# Patient Record
Sex: MALE
[De-identification: names, ages, dates, MRNs, and addresses within clinical notes are randomized per-mention and may not be internally consistent; named-entity substitution may affect disease eponyms.]

---

## 2023-03-31 PROBLEM — Z00.00 ENCOUNTER FOR PREVENTIVE HEALTH EXAMINATION: Status: ACTIVE | Noted: 2023-03-31

## 2023-04-01 ENCOUNTER — NON-APPOINTMENT (OUTPATIENT)
Age: 73
End: 2023-04-01

## 2023-04-03 ENCOUNTER — TRANSCRIPTION ENCOUNTER (OUTPATIENT)
Age: 73
End: 2023-04-03

## 2023-04-03 ENCOUNTER — RESULT REVIEW (OUTPATIENT)
Age: 73
End: 2023-04-03

## 2023-04-03 ENCOUNTER — OUTPATIENT (OUTPATIENT)
Dept: OUTPATIENT SERVICES | Facility: HOSPITAL | Age: 73
LOS: 1 days | End: 2023-04-03
Payer: MEDICARE

## 2023-04-03 ENCOUNTER — APPOINTMENT (OUTPATIENT)
Dept: ORTHOPEDIC SURGERY | Facility: CLINIC | Age: 73
End: 2023-04-03
Payer: MEDICARE

## 2023-04-03 VITALS
OXYGEN SATURATION: 98 % | HEART RATE: 86 BPM | WEIGHT: 221 LBS | TEMPERATURE: 98.1 F | RESPIRATION RATE: 18 BRPM | DIASTOLIC BLOOD PRESSURE: 77 MMHG | BODY MASS INDEX: 30.94 KG/M2 | SYSTOLIC BLOOD PRESSURE: 128 MMHG | HEIGHT: 71 IN

## 2023-04-03 DIAGNOSIS — Z86.39 PERSONAL HISTORY OF OTHER ENDOCRINE, NUTRITIONAL AND METABOLIC DISEASE: ICD-10-CM

## 2023-04-03 DIAGNOSIS — Z80.8 FAMILY HISTORY OF MALIGNANT NEOPLASM OF OTHER ORGANS OR SYSTEMS: ICD-10-CM

## 2023-04-03 DIAGNOSIS — Z78.9 OTHER SPECIFIED HEALTH STATUS: ICD-10-CM

## 2023-04-03 PROCEDURE — 77073 BONE LENGTH STUDIES: CPT | Mod: 26

## 2023-04-03 PROCEDURE — 77073 BONE LENGTH STUDIES: CPT

## 2023-04-03 PROCEDURE — 73564 X-RAY EXAM KNEE 4 OR MORE: CPT

## 2023-04-03 PROCEDURE — 99204 OFFICE O/P NEW MOD 45 MIN: CPT

## 2023-04-03 PROCEDURE — 73564 X-RAY EXAM KNEE 4 OR MORE: CPT | Mod: 26,50

## 2023-04-04 ENCOUNTER — OUTPATIENT (OUTPATIENT)
Dept: OUTPATIENT SERVICES | Facility: HOSPITAL | Age: 73
LOS: 1 days | End: 2023-04-04

## 2023-04-04 ENCOUNTER — APPOINTMENT (OUTPATIENT)
Dept: MRI IMAGING | Facility: CLINIC | Age: 73
End: 2023-04-04
Payer: MEDICARE

## 2023-04-04 PROCEDURE — 73721 MRI JNT OF LWR EXTRE W/O DYE: CPT | Mod: 26,LT

## 2023-04-10 ENCOUNTER — NON-APPOINTMENT (OUTPATIENT)
Age: 73
End: 2023-04-10

## 2023-04-10 DIAGNOSIS — M25.562 PAIN IN LEFT KNEE: ICD-10-CM

## 2023-04-12 ENCOUNTER — APPOINTMENT (OUTPATIENT)
Dept: ORTHOPEDIC SURGERY | Facility: CLINIC | Age: 73
End: 2023-04-12
Payer: MEDICARE

## 2023-04-12 VITALS — BODY MASS INDEX: 30.94 KG/M2 | HEIGHT: 71 IN | WEIGHT: 221 LBS

## 2023-04-12 PROCEDURE — 20610 DRAIN/INJ JOINT/BURSA W/O US: CPT | Mod: 50

## 2023-04-12 PROCEDURE — 99213 OFFICE O/P EST LOW 20 MIN: CPT | Mod: 25

## 2023-04-12 NOTE — HISTORY OF PRESENT ILLNESS
[de-identified] : Eryn is a 73 year old male with Left knee pain for 1 month without injury. H/o chronic knee pain for 25 years. He had Hyaluronic Acid knee injections about 15 years ago. Currently, he is having left medial knee pain with bending, rising from a chair, stairs and trying to put on his shoes and socks.\par \par The pain is localized to the medial joint line. No lateral pain. No anterior knee pain. No posterior knee pain. It is a crunching sensation sometimes, a feeling like bubbles in the knee that hurt with bending on the medial side.\par \par He had an MRI showing a medial meniscus tear on the left knee and he has mild arthritis is the right knee.\par \par He is going to [place] this week for 10 weeks and has requested an injection of steroids into both knees to help him get by until he returns and has an arthroscopic medial meniscectomy of the left knee.\par

## 2023-04-12 NOTE — PHYSICAL EXAM
[de-identified] : Left Knee/Lower Extremity: \par \par Skin: Normal. No erythema, no ecchymosis, no abrasions, no scratches, no tattoos.  \par                 \par Old Incisions:  None. \par \par Knee Joint Swelling: \par Patient positive effusion.  Mild\par \par Popliteal Swelling: None. 	\par \par Pre-Patella Bursa Swelling:  None.         \par \par Alignment: Neutral\par \par ROM Extension: 0degrees.   \par ROM Flexion:  120 degrees.   \par \par Knee Joint Line Tenderness:  \par Tender at medial joint line. \par Not tender at the lateral joint line.   \par Not tender in the patellofemoral compartment. \par Not tender in the Posterior Knee.  \par \par Remedios’s Test: Positive. Medial \par \par Patella Compression Test: Negative \par Patellofemoral Crepitus: None.  \par Patellofemoral Apprehension Testing: Negative.  \par Patellofemoral Laxity: Normal. \par \par Medial Collateral Ligament Laxity: Good endpoint.                                                             \par Medial opening to valgus stress.  Mild\par \par Lateral Collateral Ligament Laxity: Normal laxity. Good endpoint.          \par \par ACL Testing: 			\par Stable ACL. Good Endpoint. \par Lachman Test: Negative \par Anterior Drawer Test: Negative \par \par PCL Testing: 			\par Stable PCL.  Good Endpoint.                                                                  \par Posterior Drawer Test: Negative \par \par Motor Strength: 			\par Quadriceps strength is 5 out of 5 \par Hamstring strength is 5 out of 5 \par Ankle dorsiflexion strength is 5 out of 5 \par Ankle plantarflexion strength is 5 out of 5 \par \par Sensation: 		\par Light tough sensation in the lower extremity is grossly normal.  \par \par Pulses:  Pulses are palpable at the ankle at the Dorsalis Pedis Artery.  Pulses are palpable at the Posterior Tibialis Artery. \par \par Hip Motion:   The patient has full and painless hip range of motion. \par \par Hip Tenderness:  The patient has no Greater Trochanteric hip bursa tenderness. \par \par Lumbar Spine Symptoms: Negative straight leg raise.   \par \par \par Gait:  Normal Gait.  \par \par Assistive Devices: 	None.    \par \par Right Knee:\par \par Mild medial joint line tenderness.\par Good stability to varus and valgus stress.\par ACL PCL intact to stress testing with negative drawer tests.\par Pulses palpable at the foot.\par Motor function is 5/5 with knee extension and flexion.\par  [de-identified] : Mri of the left knee reviewed with the patient showing him the medial meniscus tear.

## 2023-04-12 NOTE — PROCEDURE
[de-identified] : Knee Joint Cortisone Injection, Left: \par Verbal consent was obtained from the patient for a left knee injection after the risks and benefits were discussed. The patient was made comfortable in the seated position with the knee at 90 degrees hanging over the table. The patient’s knee injection site was then sterilely prepped. Local anesthetic was used to desensitize the skin prior to the injection. A sterile 22-guage needle on a sterile syringe was used to introduce the injectable liquid into the knee joint through an inferior parapatellar tendon approach. The needle was withdrawn, and a sterile band-aid was placed over the injection site. Pressure was applied to the injection site for several minutes to help with hemostasis as the injection site. The patient tolerated the procedure well.\par \par Medications injected into the knee:\par a. Steroid: Depomedrol 40mg.\par b. Local anesthetic: Lidocaine2%, Marcaine 0.25%\par \par Knee Joint Cortisone Injection, Right: \par Verbal consent was obtained from the patient for a right knee injection after the risks and benefits were discussed. The patient was made comfortable in the seated position with the knee at 90 degrees hanging over the table. The patient’s knee injection site was then sterilely prepped. Local anesthetic was used to desensitize the skin prior to the injection. A sterile 22-guage needle on a sterile syringe was used to introduce the injectable liquid into the knee joint through an inferior parapatellar tendon approach. The needle was withdrawn, and a sterile band-aid was placed over the injection site. Pressure was applied to the injection site for several minutes to help with hemostasis as the injection site. The patient tolerated the procedure well.\par \par Medications injected into the knee:\par a. Steroid:Depomedrol 40/ml, 1ml injected.\par b. Local anesthetic: Lidocaine2% 1ml, Marcaine 0.25% 1ml\par \par

## 2023-04-12 NOTE — HISTORY OF PRESENT ILLNESS
[de-identified] : Eryn is a 73 year old male with Left knee pain for 1 month without injury. H/o chronic knee pain for 25 years. He had Hyaluronic Acid knee injections about 15 years ago. Currently, he is having left medial knee pain with bending, rising from a chair, stairs and trying to put on his shoes and socks.\par \par The pain is localized to the medial joint line. No lateral pain. No anterior knee pain. No posterior knee pain. It is a crunching sensation sometimes, a feeling like bubbles in the knee that hurt with bending on the medial side.\par \par He had an MRI showing a medial meniscus tear on the left knee and he has mild arthritis is the right knee.\par \par He is going to [place] this week for 10 weeks and has requested an injection of steroids into both knees to help him get by until he returns and has an arthroscopic medial meniscectomy of the left knee.\par

## 2023-04-12 NOTE — PHYSICAL EXAM
[de-identified] : Left Knee/Lower Extremity: \par \par Skin: Normal. No erythema, no ecchymosis, no abrasions, no scratches, no tattoos.  \par                 \par Old Incisions:  None. \par \par Knee Joint Swelling: \par Patient positive effusion.  Mild\par \par Popliteal Swelling: None. 	\par \par Pre-Patella Bursa Swelling:  None.         \par \par Alignment: Neutral\par \par ROM Extension: 0degrees.   \par ROM Flexion:  120 degrees.   \par \par Knee Joint Line Tenderness:  \par Tender at medial joint line. \par Not tender at the lateral joint line.   \par Not tender in the patellofemoral compartment. \par Not tender in the Posterior Knee.  \par \par Remedios’s Test: Positive. Medial \par \par Patella Compression Test: Negative \par Patellofemoral Crepitus: None.  \par Patellofemoral Apprehension Testing: Negative.  \par Patellofemoral Laxity: Normal. \par \par Medial Collateral Ligament Laxity: Good endpoint.                                                             \par Medial opening to valgus stress.  Mild\par \par Lateral Collateral Ligament Laxity: Normal laxity. Good endpoint.          \par \par ACL Testing: 			\par Stable ACL. Good Endpoint. \par Lachman Test: Negative \par Anterior Drawer Test: Negative \par \par PCL Testing: 			\par Stable PCL.  Good Endpoint.                                                                  \par Posterior Drawer Test: Negative \par \par Motor Strength: 			\par Quadriceps strength is 5 out of 5 \par Hamstring strength is 5 out of 5 \par Ankle dorsiflexion strength is 5 out of 5 \par Ankle plantarflexion strength is 5 out of 5 \par \par Sensation: 		\par Light tough sensation in the lower extremity is grossly normal.  \par \par Pulses:  Pulses are palpable at the ankle at the Dorsalis Pedis Artery.  Pulses are palpable at the Posterior Tibialis Artery. \par \par Hip Motion:   The patient has full and painless hip range of motion. \par \par Hip Tenderness:  The patient has no Greater Trochanteric hip bursa tenderness. \par \par Lumbar Spine Symptoms: Negative straight leg raise.   \par \par \par Gait:  Normal Gait.  \par \par Assistive Devices: 	None.    \par \par Right Knee:\par \par Mild medial joint line tenderness.\par Good stability to varus and valgus stress.\par ACL PCL intact to stress testing with negative drawer tests.\par Pulses palpable at the foot.\par Motor function is 5/5 with knee extension and flexion.\par  [de-identified] : Mri of the left knee reviewed with the patient showing him the medial meniscus tear.

## 2023-04-12 NOTE — DISCUSSION/SUMMARY
[de-identified] : Assessment:\par -left medial meniscus tear\par -left mild to moderate medial compartment joint space narrowing.\par -chronic pain in the left knee\par -left knee mild to moderate osteoarthritis\par \par -Right knee pain\par -Right mild to moderate medial compartment joint space narrowing.\par -Right knee mild to moderate osteoarthritis\par \par Plan:\par Both knees were injected today with local and corticosteroids under sterile techinque.\par He tolerated both well.\par He is going to [place] this week for 10 weeks and will contact me when he returns to see about a left medial meniscectomy arthroscopically verses a medial partial knee replacement.

## 2023-04-12 NOTE — DISCUSSION/SUMMARY
[de-identified] : Assessment:\par -left medial meniscus tear\par -left mild to moderate medial compartment joint space narrowing.\par -chronic pain in the left knee\par -left knee mild to moderate osteoarthritis\par \par -Right knee pain\par -Right mild to moderate medial compartment joint space narrowing.\par -Right knee mild to moderate osteoarthritis\par \par Plan:\par Both knees were injected today with local and corticosteroids under sterile techinque.\par He tolerated both well.\par He is going to [place] this week for 10 weeks and will contact me when he returns to see about a left medial meniscectomy arthroscopically verses a medial partial knee replacement.

## 2023-04-12 NOTE — PROCEDURE
[de-identified] : Knee Joint Cortisone Injection, Left: \par Verbal consent was obtained from the patient for a left knee injection after the risks and benefits were discussed. The patient was made comfortable in the seated position with the knee at 90 degrees hanging over the table. The patient’s knee injection site was then sterilely prepped. Local anesthetic was used to desensitize the skin prior to the injection. A sterile 22-guage needle on a sterile syringe was used to introduce the injectable liquid into the knee joint through an inferior parapatellar tendon approach. The needle was withdrawn, and a sterile band-aid was placed over the injection site. Pressure was applied to the injection site for several minutes to help with hemostasis as the injection site. The patient tolerated the procedure well.\par \par Medications injected into the knee:\par a. Steroid: Depomedrol 40mg.\par b. Local anesthetic: Lidocaine2%, Marcaine 0.25%\par \par Knee Joint Cortisone Injection, Right: \par Verbal consent was obtained from the patient for a right knee injection after the risks and benefits were discussed. The patient was made comfortable in the seated position with the knee at 90 degrees hanging over the table. The patient’s knee injection site was then sterilely prepped. Local anesthetic was used to desensitize the skin prior to the injection. A sterile 22-guage needle on a sterile syringe was used to introduce the injectable liquid into the knee joint through an inferior parapatellar tendon approach. The needle was withdrawn, and a sterile band-aid was placed over the injection site. Pressure was applied to the injection site for several minutes to help with hemostasis as the injection site. The patient tolerated the procedure well.\par \par Medications injected into the knee:\par a. Steroid:Depomedrol 40/ml, 1ml injected.\par b. Local anesthetic: Lidocaine2% 1ml, Marcaine 0.25% 1ml\par \par

## 2023-04-12 NOTE — REASON FOR VISIT
[Follow-Up Visit] : a follow-up visit for [[email or web address]] : left and right knee pain and an injection into both knees.

## 2023-07-26 ENCOUNTER — APPOINTMENT (OUTPATIENT)
Dept: ORTHOPEDIC SURGERY | Facility: CLINIC | Age: 73
End: 2023-07-26
Payer: MEDICARE

## 2023-07-26 VITALS
HEART RATE: 81 BPM | WEIGHT: 221 LBS | RESPIRATION RATE: 18 BRPM | BODY MASS INDEX: 30.94 KG/M2 | SYSTOLIC BLOOD PRESSURE: 115 MMHG | OXYGEN SATURATION: 98 % | HEIGHT: 71 IN | DIASTOLIC BLOOD PRESSURE: 69 MMHG

## 2023-07-26 DIAGNOSIS — Z01.812 ENCOUNTER FOR PREPROCEDURAL LABORATORY EXAMINATION: ICD-10-CM

## 2023-07-26 PROCEDURE — 99215 OFFICE O/P EST HI 40 MIN: CPT

## 2023-07-26 RX ORDER — HYDROCODONE BITARTRATE AND ACETAMINOPHEN 7.5; 325 MG/1; MG/1
7.5-325 TABLET ORAL
Qty: 16 | Refills: 0 | Status: ACTIVE | COMMUNITY
Start: 2023-07-26 | End: 1900-01-01

## 2023-07-26 NOTE — HISTORY OF PRESENT ILLNESS
[de-identified] : Eryn is a 73 years old male who presents today for a follow up visit and preoperative discussion regarding his left knee.\par He has a medial meniscus tear and moderate medial compartment osteoarthritis. We discussed his symptoms and pain he is still experiencing. He just returned from his long trip to [place] and did okay. The cortisone injection helped for about 4 weeks but the knee now is back to pretty sore and he gets sharp pains in the posterior medial knee at times. \par We discussed arthroscopic medial menisectomy vs medial partial knee replacement today with his wife present. We decided to go simple first with the knee scope and see how he does. If he still is having difficulty after, we discussed injection therapy and then partial knee replacement in the future if this is not a good enough relief of his pain.

## 2023-07-26 NOTE — PHYSICAL EXAM
[de-identified] : Left Knee/Lower Extremity: \par \par Skin: Normal. No erythema, no ecchymosis, no abrasions, no scratches, no tattoos.  \par                 \par Old Incisions:  None. \par \par Knee Joint Swelling: \par Patient positive effusion.  Mild\par \par Popliteal Swelling: None. 	\par \par Pre-Patella Bursa Swelling:  None.         \par \par Alignment: Neutral\par \par ROM Extension: 0 degrees.   \par ROM Flexion:  120 degrees.   \par \par Knee Joint Line Tenderness:  \par Tender at medial joint line. \par Not tender at the lateral joint line.   \par Not tender in the patellofemoral compartment. \par Tender in the Posterior Knee.  \par \par Remedios’s Test: Positive. Medial \par \par Patella Compression Test: Negative \par Patellofemoral Crepitus: None.  \par Patellofemoral Apprehension Testing: Negative.  \par Patellofemoral Laxity: Normal. \par \par Medial Collateral Ligament Laxity: Good endpoint.                                                             \par Medial opening to valgus stress.  Mild\par \par Lateral Collateral Ligament Laxity: Normal laxity. Good endpoint.          \par \par ACL Testing: 			\par Stable ACL. Good Endpoint. \par Lachman Test: Negative \par Anterior Drawer Test: Negative \par \par PCL Testing: 			\par Stable PCL.  Good Endpoint.                                                                  \par Posterior Drawer Test: Negative \par \par Motor Strength: 			\par Quadriceps strength is 5 out of 5 \par Hamstring strength is 5 out of 5 \par Ankle dorsiflexion strength is 5 out of 5 \par Ankle plantarflexion strength is 5 out of 5 \par \par Sensation: 		\par Light tough sensation in the lower extremity is grossly normal.  \par \par Pulses:  Pulses are palpable at the ankle at the Dorsalis Pedis Artery.  Pulses are palpable at the Posterior Tibialis Artery. \par \par Hip Motion:   The patient has full and painless hip range of motion. \par \par Hip Tenderness:  The patient has no Greater Trochanteric hip bursa tenderness. \par \par Lumbar Spine Symptoms: Negative straight leg raise.   \par \par Gait:  Mild limp\par \par Assistive Devices: 	None.    \par \par  [de-identified] : Mri: of the left knee reviewed with the patient showing him the medial meniscus tear.\par \par xrays: show moderate medial joint space loss with preserved lateral and patellofemoral compartments.

## 2023-07-26 NOTE — DISCUSSION/SUMMARY
[de-identified] : Assessment:\par \par Left Knee:\par \par - Left knee pain medially\par - Left knee medial meniscus tear clinically with medial knee pain.\par - MRI shows left knee medial meniscus tear.\par - Intact ACL, PCL\par - No patellofemoral pain or lateral compartment pain.\par - Failure of non surgical management for the meniscus tear of the knee.\par - Excellent candidate for arthroscopic medial meniscectomy.\par \par \par Plan:\par \par 1. The patient would like to proceed with Medial  Meniscectomy of the knee.\par 2. The side, Left.\par 3. The procedure will be performed arthroscopically.\par 4. The patient discussed surgery information details with our coordinator, the surgery information will be provided in a folder to take home.\par 5. We will schedule a Post-Op office visit for after surgery to change dressings and remove the sutures.\par 6. Medical Clearance will be completed Friday. Labs have been drawn.\par 7. Location will be [facility].\par 8. I have had an informed consent discussion of the risks and benefits with the patient & documentation was provided\par \par \par Discussion Knee Arthroscopy & Meniscectomy:\par \par I had a discussion with the patient and his wife today regarding the findings of the history, exam and imaging. We discussed the option of Arthroscopic Knee Surgery and meniscectomy. We discussed the indications, surgical process, my techniques of surgery, the probable post-operative course and instructions, and the risks and benefits of the procedure. The patient had their questions answered to their satisfaction.\par \par The risks of this procedure include but are not limited to the risks of anesthesia, heart attack, stroke, respiratory complications, wound healing problems, delayed wound healing, infection, bleeding, nerve damage, vessel damage, skin sensory changes next to the incisions, sensitive skin incisions, neuroma’s, loss of motion, scar tissue formation requiring further treatment, blood clots, continued pain despite proper treatment of the injury, soft tissue pain, continued knee swelling, the possibility of future tears of the meniscus, progression of arthritis after surgery, and the possible need for further surgery in the future.\par \par Although there are no guarantees to success, I feel that the surgery would be very beneficial and there is a good chance for a positive outcome. We discussed the future need for a medial partial knee replacement as the compartment wears down and the possible role of injection therapy after the knee scope.\par \par The patient had their questions answered to their satisfaction. \par The patient understood our discussion that was aided by the use of knee models and a review of their imaging on the computer screen. \par The patient expressed understanding and agreement with the plan.\par \par I look forward to the opportunity to help this patient with their painful knee condition.

## 2023-07-31 LAB
APTT BLD: 30.8 SEC
INR PPP: 1.03 RATIO
PT BLD: 11.6 SEC

## 2023-08-01 ENCOUNTER — APPOINTMENT (OUTPATIENT)
Age: 73
End: 2023-08-01
Payer: MEDICARE

## 2023-08-01 PROCEDURE — 29880 ARTHRS KNE SRG MNISECTMY M&L: CPT | Mod: LT

## 2023-08-16 ENCOUNTER — APPOINTMENT (OUTPATIENT)
Dept: ORTHOPEDIC SURGERY | Facility: CLINIC | Age: 73
End: 2023-08-16
Payer: MEDICARE

## 2023-08-16 VITALS
OXYGEN SATURATION: 99 % | RESPIRATION RATE: 18 BRPM | SYSTOLIC BLOOD PRESSURE: 117 MMHG | BODY MASS INDEX: 30.94 KG/M2 | DIASTOLIC BLOOD PRESSURE: 73 MMHG | HEART RATE: 81 BPM | HEIGHT: 71 IN | WEIGHT: 221 LBS

## 2023-08-16 DIAGNOSIS — S83.232D COMPLEX TEAR OF MEDIAL MENISCUS, CURRENT INJURY, LEFT KNEE, SUBSEQUENT ENCOUNTER: ICD-10-CM

## 2023-08-16 DIAGNOSIS — S83.282D OTHER TEAR OF LATERAL MENISCUS, CURRENT INJURY, LEFT KNEE, SUBSEQUENT ENCOUNTER: ICD-10-CM

## 2023-08-16 PROCEDURE — 99024 POSTOP FOLLOW-UP VISIT: CPT

## 2023-08-16 NOTE — HISTORY OF PRESENT ILLNESS
[de-identified] : Eryn is a 73 year old male who presents today for a post op visit.  Post op Day: 2 weeks Surgery Type: Arthroscopy with medial meniscectomy Side of Surgery: Left Date of Surgery: 08.01.23  Pain Level: 2 Assistive Device: cane Satisfaction Level: Very Satisfied  Activities: Walking and home exercises. Will start on some outpatient PT for muscle strengthening. [de-identified] : Eryn is a 73 year old male who presents today 2 weeks s/p. He states he is feeling good. His sutures were removed and steristrips applied.  [de-identified] : Left knee: wounds healing nicely. Sutures removed. Steristrips applied. Minimal effusion. No calf swelling. Good pulses at the foot. Walking with cane.  [de-identified] : none today. Reviewed scope images today.  [de-identified] : -2 weeks s/p left knee scope - Medial and lateral meniscectomies. - has moderate medial compartment OA. - Doing well at 2 weeks.  [de-identified] : - [facility] - Start PT this week. - Followup in 3 weeks.

## 2023-09-13 ENCOUNTER — APPOINTMENT (OUTPATIENT)
Dept: ORTHOPEDIC SURGERY | Facility: CLINIC | Age: 73
End: 2023-09-13
Payer: MEDICARE

## 2023-09-13 VITALS
SYSTOLIC BLOOD PRESSURE: 112 MMHG | HEART RATE: 73 BPM | HEIGHT: 71 IN | RESPIRATION RATE: 18 BRPM | OXYGEN SATURATION: 97 % | WEIGHT: 221 LBS | BODY MASS INDEX: 30.94 KG/M2 | DIASTOLIC BLOOD PRESSURE: 69 MMHG

## 2023-09-13 PROCEDURE — 99024 POSTOP FOLLOW-UP VISIT: CPT

## 2023-10-30 ENCOUNTER — APPOINTMENT (OUTPATIENT)
Dept: ORTHOPEDIC SURGERY | Facility: CLINIC | Age: 73
End: 2023-10-30
Payer: MEDICARE

## 2023-10-30 VITALS
OXYGEN SATURATION: 99 % | WEIGHT: 221 LBS | SYSTOLIC BLOOD PRESSURE: 100 MMHG | HEIGHT: 71 IN | BODY MASS INDEX: 30.94 KG/M2 | DIASTOLIC BLOOD PRESSURE: 61 MMHG | HEART RATE: 79 BPM

## 2023-10-30 PROCEDURE — 99024 POSTOP FOLLOW-UP VISIT: CPT

## 2024-01-29 ENCOUNTER — APPOINTMENT (OUTPATIENT)
Dept: ORTHOPEDIC SURGERY | Facility: CLINIC | Age: 74
End: 2024-01-29
Payer: MEDICARE

## 2024-01-29 VITALS
OXYGEN SATURATION: 97 % | SYSTOLIC BLOOD PRESSURE: 124 MMHG | HEART RATE: 82 BPM | HEIGHT: 71 IN | DIASTOLIC BLOOD PRESSURE: 77 MMHG | WEIGHT: 221 LBS | BODY MASS INDEX: 30.94 KG/M2

## 2024-01-29 DIAGNOSIS — S83.232S COMPLEX TEAR OF MEDIAL MENISCUS, CURRENT INJURY, LEFT KNEE, SEQUELA: ICD-10-CM

## 2024-01-29 PROCEDURE — 99214 OFFICE O/P EST MOD 30 MIN: CPT

## 2024-02-02 PROBLEM — S83.232S COMPLEX TEAR OF MEDIAL MENISCUS OF LEFT KNEE AS CURRENT INJURY, SEQUELA: Status: ACTIVE | Noted: 2023-09-13

## 2024-02-02 NOTE — PHYSICAL EXAM
[de-identified] : Left Knee Exam:  Incision: Healed arthroscopy portals.  Skin:  Healthy appearing. No erythema.  No ecchymosis.  No drainage.     Knee Joint Swelling: No Swelling.  Alignment: Neutral.                               ROM Extension: 3 degrees.   ROM Flexion: 120 degrees.   Medial Collateral Ligament Laxity:  Normal laxity. Good endpoint.  Lateral Collateral Ligament Laxity: Normal laxity. Good endpoint.  Anterior Drawer Test: No significant translation. Good endpoint. Posterior Drawer Test:  Stable with good endpoint   Motor Strength:   Quadriceps strength is 5 out of 5 Hamstring strength is 5 out of 5 Ankle dorsiflexion strength is 5 out of 5 Ankle plantarflexion strength is 5 out of 5  Sensation:  Light tough sensation in the lower extremity is grossly normal.  Sensory change is located lateral to incision as expected.   Pulses: Pulses are palpable at the ankle at the Dorsalis Pedis Artery.  Pulses are palpable at the Posterior Tibialis Artery.                                                                Gait: Limping Gait mild.  Assistive Devices: None

## 2024-02-02 NOTE — HISTORY OF PRESENT ILLNESS
[de-identified] : Eryn is a 74 year old male who presents today with some left knee pain here and there. He is about 6 months from a medial meniscectomy.

## 2024-02-02 NOTE — DISCUSSION/SUMMARY
[de-identified] : Impression: 6 months after left medial meniscus surgery. Mild to moderate osteoarthrtis in medial compartment. Occational ache in the medial compartment. Overall improved from surgery. Has aching with prolonged walking.  Plan: Continue home exercise program [place] after activity. Will plan to do a cortisone injection before he heads back to [place] in a couple months.

## 2024-03-27 ENCOUNTER — APPOINTMENT (OUTPATIENT)
Dept: ORTHOPEDIC SURGERY | Facility: CLINIC | Age: 74
End: 2024-03-27
Payer: MEDICARE

## 2024-03-27 VITALS
WEIGHT: 221 LBS | SYSTOLIC BLOOD PRESSURE: 128 MMHG | DIASTOLIC BLOOD PRESSURE: 71 MMHG | HEART RATE: 96 BPM | BODY MASS INDEX: 30.94 KG/M2 | HEIGHT: 71 IN | OXYGEN SATURATION: 99 % | RESPIRATION RATE: 18 BRPM

## 2024-03-27 DIAGNOSIS — M17.12 UNILATERAL PRIMARY OSTEOARTHRITIS, LEFT KNEE: ICD-10-CM

## 2024-03-27 DIAGNOSIS — G89.29 PAIN IN RIGHT KNEE: ICD-10-CM

## 2024-03-27 DIAGNOSIS — G89.29 PAIN IN LEFT KNEE: ICD-10-CM

## 2024-03-27 DIAGNOSIS — M25.561 PAIN IN RIGHT KNEE: ICD-10-CM

## 2024-03-27 DIAGNOSIS — M25.562 PAIN IN LEFT KNEE: ICD-10-CM

## 2024-03-27 DIAGNOSIS — M17.11 UNILATERAL PRIMARY OSTEOARTHRITIS, RIGHT KNEE: ICD-10-CM

## 2024-03-27 PROCEDURE — 20610 DRAIN/INJ JOINT/BURSA W/O US: CPT | Mod: 50

## 2024-03-27 PROCEDURE — 99214 OFFICE O/P EST MOD 30 MIN: CPT | Mod: 25

## 2024-03-27 NOTE — HISTORY OF PRESENT ILLNESS
[de-identified] : Eryn is a 74 year old male who presents today with bilateral knee pain. He is here for bilateral cortisone injections before traveling to [place]. He is now 7 months s/p left knee arthroscopy and medial meniscectomy. He has moderate OA of the left knee and is trying to avoid knee replacement for the time being. He has had improvement in his knee pain and function with cortisone injections. He will be heading to [place] and returning in July.

## 2024-03-27 NOTE — PHYSICAL EXAM
[de-identified] : Left Knee Exam:  Incision: Healed arthroscopy portals.  Skin:  Healthy appearing. No erythema.  No ecchymosis.  No drainage.     Knee Joint Swelling: Mild effusion.  Alignment: Neutral.                               ROM Extension: 3 degrees.   ROM Flexion: 120 degrees.   Medial Collateral Ligament Laxity:  Normal laxity. Good endpoint. Lateral Collateral Ligament Laxity: Normal laxity. Good endpoint.  Anterior Drawer Test: No significant translation. Good endpoint. Posterior Drawer Test:  Stable with good endpoint   Motor Strength:   Quadriceps strength is 5 out of 5 Hamstring strength is 5 out of 5 Ankle dorsiflexion strength is 5 out of 5 Ankle plantarflexion strength is 5 out of 5  Sensation:  Light tough sensation in the lower extremity is grossly normal.  Sensory change is located lateral to incision as expected.   Pulses: Pulses are palpable at the ankle at the Dorsalis Pedis Artery.  Pulses are palpable at the Posterior Tibialis Artery.                                                               Gait: Limping Gait mild.  Assistive Devices: None  Right Knee: Mild medial joint line tenderness Mild effusion of the right. Mild opening of the medial compartment to valgus stress. No PF tenderness with ROM. Good pulses at the ankle.

## 2024-03-27 NOTE — PROCEDURE
[de-identified] : Knee Joint Cortisone Injection, Left: Verbal consent was obtained from the patient for a left knee injection after the risks and benefits were discussed. The patient was made comfortable in the seated position with the knee at 90 degrees hanging over the table. The patients knee injection site was then sterilely prepped. Local anesthetic was used to desensitize the skin prior to the injection. A sterile 22-guage needle on a sterile syringe was used to introduce the injectable liquid into the knee joint through an inferior parapatellar tendon approach. The needle was withdrawn, and a sterile band-aid was placed over the injection site. Pressure was applied to the injection site for several minutes to help with hemostasis at the injection site. The patient tolerated the procedure well.   Medications injected into the knee: a. Steroid: Celestone 6mg/ml, 1ml injected. b. Local anesthetic: Lidocaine1% 1ml, Marcaine 0.25% 1ml   Knee Joint Cortisone Injection, Right:  Verbal consent was obtained from the patient for a right knee injection after the risks and benefits were discussed. The patient was made comfortable in the seated position with the knee at 90 degrees hanging over the table. The patients knee injection site was then sterilely prepped. Local anesthetic was used to desensitize the skin prior to the injection. A sterile 22-guage needle on a sterile syringe was used to introduce the injectable liquid into the knee joint through an inferior parapatellar tendon approach. The needle was withdrawn, and a sterile band-aid was placed over the injection site. Pressure was applied to the injection site for several minutes to help with hemostasis at the injection site. The patient tolerated the procedure well.  Medications injected into the knee: a. Steroid: Celestone 6mg/ml, 1ml injected. b. Local anesthetic: Lidocaine1% 1ml, Marcaine 0.25% 1ml

## 2024-03-27 NOTE — DISCUSSION/SUMMARY
[de-identified] : Impression: Left greater than right osteoarthrtis of the knees. S/P left knee arthroscopy 7 months ago. Uneven surfaces and long walks are uncomfortable but cortisone has helped.  Plan: I injected both knees today with Celestone and local. He tolerated them well and will return after his trip to [facility] that will end in July.

## 2024-10-07 ENCOUNTER — NON-APPOINTMENT (OUTPATIENT)
Age: 74
End: 2024-10-07

## 2024-10-09 ENCOUNTER — APPOINTMENT (OUTPATIENT)
Dept: ORTHOPEDIC SURGERY | Facility: CLINIC | Age: 74
End: 2024-10-09
Payer: MEDICARE

## 2024-10-09 VITALS
HEART RATE: 83 BPM | OXYGEN SATURATION: 99 % | RESPIRATION RATE: 18 BRPM | BODY MASS INDEX: 29.4 KG/M2 | SYSTOLIC BLOOD PRESSURE: 110 MMHG | WEIGHT: 210 LBS | HEIGHT: 71 IN | DIASTOLIC BLOOD PRESSURE: 72 MMHG

## 2024-10-09 DIAGNOSIS — M25.561 PAIN IN RIGHT KNEE: ICD-10-CM

## 2024-10-09 DIAGNOSIS — M17.12 UNILATERAL PRIMARY OSTEOARTHRITIS, LEFT KNEE: ICD-10-CM

## 2024-10-09 DIAGNOSIS — M25.562 PAIN IN LEFT KNEE: ICD-10-CM

## 2024-10-09 DIAGNOSIS — G89.29 PAIN IN LEFT KNEE: ICD-10-CM

## 2024-10-09 DIAGNOSIS — M17.11 UNILATERAL PRIMARY OSTEOARTHRITIS, RIGHT KNEE: ICD-10-CM

## 2024-10-09 DIAGNOSIS — G89.29 PAIN IN RIGHT KNEE: ICD-10-CM

## 2024-10-09 PROCEDURE — 20610 DRAIN/INJ JOINT/BURSA W/O US: CPT | Mod: 50

## 2024-10-09 PROCEDURE — 99214 OFFICE O/P EST MOD 30 MIN: CPT | Mod: 25

## 2025-01-08 ENCOUNTER — APPOINTMENT (OUTPATIENT)
Dept: RADIOLOGY | Facility: CLINIC | Age: 75
End: 2025-01-08
Payer: MEDICARE

## 2025-01-08 ENCOUNTER — OUTPATIENT (OUTPATIENT)
Dept: OUTPATIENT SERVICES | Facility: HOSPITAL | Age: 75
LOS: 1 days | End: 2025-01-08

## 2025-01-08 ENCOUNTER — APPOINTMENT (OUTPATIENT)
Dept: MRI IMAGING | Facility: CLINIC | Age: 75
End: 2025-01-08
Payer: MEDICARE

## 2025-01-08 PROCEDURE — 73721 MRI JNT OF LWR EXTRE W/O DYE: CPT | Mod: 26,RT

## 2025-01-08 PROCEDURE — 73564 X-RAY EXAM KNEE 4 OR MORE: CPT | Mod: 26,50,XE

## 2025-01-08 PROCEDURE — 77073 BONE LENGTH STUDIES: CPT | Mod: 26

## 2025-01-10 ENCOUNTER — APPOINTMENT (OUTPATIENT)
Dept: ORTHOPEDIC SURGERY | Facility: CLINIC | Age: 75
End: 2025-01-10

## 2025-01-29 ENCOUNTER — NON-APPOINTMENT (OUTPATIENT)
Age: 75
End: 2025-01-29

## 2025-01-29 ENCOUNTER — APPOINTMENT (OUTPATIENT)
Dept: ORTHOPEDIC SURGERY | Facility: CLINIC | Age: 75
End: 2025-01-29
Payer: MEDICARE

## 2025-01-29 VITALS
WEIGHT: 210 LBS | DIASTOLIC BLOOD PRESSURE: 70 MMHG | BODY MASS INDEX: 29.4 KG/M2 | OXYGEN SATURATION: 98 % | HEART RATE: 82 BPM | HEIGHT: 71 IN | SYSTOLIC BLOOD PRESSURE: 108 MMHG

## 2025-01-29 DIAGNOSIS — G89.29 PAIN IN RIGHT KNEE: ICD-10-CM

## 2025-01-29 DIAGNOSIS — S83.281A OTHER TEAR OF LATERAL MENISCUS, CURRENT INJURY, RIGHT KNEE, INITIAL ENCOUNTER: ICD-10-CM

## 2025-01-29 DIAGNOSIS — M17.11 UNILATERAL PRIMARY OSTEOARTHRITIS, RIGHT KNEE: ICD-10-CM

## 2025-01-29 DIAGNOSIS — M25.561 PAIN IN RIGHT KNEE: ICD-10-CM

## 2025-01-29 PROCEDURE — 99215 OFFICE O/P EST HI 40 MIN: CPT

## 2025-04-02 ENCOUNTER — APPOINTMENT (OUTPATIENT)
Dept: ORTHOPEDIC SURGERY | Facility: CLINIC | Age: 75
End: 2025-04-02
Payer: MEDICARE

## 2025-04-02 VITALS
OXYGEN SATURATION: 98 % | HEIGHT: 71 IN | SYSTOLIC BLOOD PRESSURE: 106 MMHG | WEIGHT: 210 LBS | BODY MASS INDEX: 29.4 KG/M2 | HEART RATE: 84 BPM | DIASTOLIC BLOOD PRESSURE: 61 MMHG

## 2025-04-02 DIAGNOSIS — G89.29 PAIN IN LEFT KNEE: ICD-10-CM

## 2025-04-02 DIAGNOSIS — M25.562 PAIN IN LEFT KNEE: ICD-10-CM

## 2025-04-02 DIAGNOSIS — M17.11 UNILATERAL PRIMARY OSTEOARTHRITIS, RIGHT KNEE: ICD-10-CM

## 2025-04-02 DIAGNOSIS — M17.12 UNILATERAL PRIMARY OSTEOARTHRITIS, LEFT KNEE: ICD-10-CM

## 2025-04-02 PROCEDURE — 99213 OFFICE O/P EST LOW 20 MIN: CPT | Mod: 25

## 2025-04-02 PROCEDURE — 20610 DRAIN/INJ JOINT/BURSA W/O US: CPT | Mod: 50

## 2025-08-13 ENCOUNTER — APPOINTMENT (OUTPATIENT)
Dept: ORTHOPEDIC SURGERY | Facility: CLINIC | Age: 75
End: 2025-08-13

## 2025-08-13 VITALS
RESPIRATION RATE: 18 BRPM | HEART RATE: 80 BPM | SYSTOLIC BLOOD PRESSURE: 107 MMHG | DIASTOLIC BLOOD PRESSURE: 68 MMHG | BODY MASS INDEX: 29.4 KG/M2 | WEIGHT: 210 LBS | OXYGEN SATURATION: 99 % | HEIGHT: 71 IN

## 2025-08-13 PROCEDURE — 99215 OFFICE O/P EST HI 40 MIN: CPT | Mod: 25

## 2025-08-13 PROCEDURE — 20610 DRAIN/INJ JOINT/BURSA W/O US: CPT | Mod: 50

## 2025-08-27 ENCOUNTER — APPOINTMENT (OUTPATIENT)
Dept: ORTHOPEDIC SURGERY | Facility: CLINIC | Age: 75
End: 2025-08-27

## 2025-08-27 VITALS
OXYGEN SATURATION: 96 % | BODY MASS INDEX: 29.4 KG/M2 | HEIGHT: 71 IN | SYSTOLIC BLOOD PRESSURE: 112 MMHG | WEIGHT: 210 LBS | RESPIRATION RATE: 16 BRPM | DIASTOLIC BLOOD PRESSURE: 71 MMHG | HEART RATE: 83 BPM

## 2025-08-27 PROCEDURE — 99213 OFFICE O/P EST LOW 20 MIN: CPT

## 2025-09-08 PROBLEM — S83.281D: Status: ACTIVE | Noted: 2025-09-08

## 2025-09-11 PROBLEM — M25.561 CHRONIC PAIN OF RIGHT KNEE: Status: ACTIVE | Noted: 2025-09-11

## 2025-09-11 PROBLEM — M25.561 CHRONIC PAIN OF RIGHT KNEE: Status: RESOLVED | Noted: 2023-03-31 | Resolved: 2025-09-11

## 2025-09-15 ENCOUNTER — APPOINTMENT (OUTPATIENT)
Dept: ORTHOPEDIC SURGERY | Facility: CLINIC | Age: 75
End: 2025-09-15
Payer: MEDICARE

## 2025-09-15 VITALS — WEIGHT: 210 LBS | RESPIRATION RATE: 16 BRPM | BODY MASS INDEX: 29.4 KG/M2 | HEIGHT: 71 IN

## 2025-09-15 DIAGNOSIS — M17.11 UNILATERAL PRIMARY OSTEOARTHRITIS, RIGHT KNEE: ICD-10-CM

## 2025-09-15 PROCEDURE — 20610 DRAIN/INJ JOINT/BURSA W/O US: CPT | Mod: RT

## 2025-09-15 PROCEDURE — 99213 OFFICE O/P EST LOW 20 MIN: CPT | Mod: 25
